# Patient Record
Sex: FEMALE | Race: WHITE | NOT HISPANIC OR LATINO | Employment: FULL TIME | URBAN - METROPOLITAN AREA
[De-identification: names, ages, dates, MRNs, and addresses within clinical notes are randomized per-mention and may not be internally consistent; named-entity substitution may affect disease eponyms.]

---

## 2024-02-19 ENCOUNTER — OFFICE VISIT (OUTPATIENT)
Dept: OBSTETRICS AND GYNECOLOGY | Facility: CLINIC | Age: 29
End: 2024-02-19
Payer: COMMERCIAL

## 2024-02-19 ENCOUNTER — HOSPITAL ENCOUNTER (OUTPATIENT)
Dept: RADIOLOGY | Facility: CLINIC | Age: 29
Discharge: HOME | End: 2024-02-19
Payer: COMMERCIAL

## 2024-02-19 ENCOUNTER — APPOINTMENT (OUTPATIENT)
Dept: RADIOLOGY | Facility: CLINIC | Age: 29
End: 2024-02-19
Payer: COMMERCIAL

## 2024-02-19 VITALS
SYSTOLIC BLOOD PRESSURE: 102 MMHG | HEIGHT: 69 IN | WEIGHT: 293 LBS | DIASTOLIC BLOOD PRESSURE: 64 MMHG | BODY MASS INDEX: 43.4 KG/M2

## 2024-02-19 DIAGNOSIS — N92.6 IRREGULAR PERIODS: ICD-10-CM

## 2024-02-19 DIAGNOSIS — Z01.419 WELL WOMAN EXAM WITH ROUTINE GYNECOLOGICAL EXAM: ICD-10-CM

## 2024-02-19 PROCEDURE — 99395 PREV VISIT EST AGE 18-39: CPT | Performed by: OBSTETRICS & GYNECOLOGY

## 2024-02-19 PROCEDURE — 4004F PT TOBACCO SCREEN RCVD TLK: CPT | Performed by: OBSTETRICS & GYNECOLOGY

## 2024-02-19 PROCEDURE — 88175 CYTOPATH C/V AUTO FLUID REDO: CPT

## 2024-02-19 PROCEDURE — 88141 CYTOPATH C/V INTERPRET: CPT | Performed by: PATHOLOGY

## 2024-02-19 PROCEDURE — 87624 HPV HI-RISK TYP POOLED RSLT: CPT

## 2024-02-19 PROCEDURE — 76830 TRANSVAGINAL US NON-OB: CPT | Performed by: OBSTETRICS & GYNECOLOGY

## 2024-02-19 RX ORDER — ESTRADIOL 0.5 MG/1
TABLET ORAL
Qty: 14 TABLET | Refills: 0 | Status: SHIPPED | OUTPATIENT
Start: 2024-02-19

## 2024-02-19 RX ORDER — SUMATRIPTAN 50 MG/1
TABLET, FILM COATED ORAL
COMMUNITY
Start: 2023-02-23 | End: 2024-03-11

## 2024-02-19 RX ORDER — NORETHINDRONE ACETATE AND ETHINYL ESTRADIOL AND FERROUS FUMARATE 1.5-30(21)
1 KIT ORAL DAILY
Qty: 84 TABLET | Refills: 4 | Status: SHIPPED | OUTPATIENT
Start: 2024-02-19 | End: 2024-02-28

## 2024-02-19 RX ORDER — NORETHINDRONE ACETATE AND ETHINYL ESTRADIOL AND FERROUS FUMARATE 1.5-30(21)
1 KIT ORAL DAILY
COMMUNITY
End: 2024-02-19 | Stop reason: SDUPTHER

## 2024-02-19 NOTE — PROGRESS NOTES
Subjective   Patient ID: Angelai Babin is a 28 y.o. female who presents for Well Women Visit (Annual exam with pap smear/- refill birth control//C/o  still getting periods twice a month//Chaperone declined).  HPI  As contained in the chief complaint  Review of Systems  10 symptoms have been reviewed and are negative and noncontributory to the patient current ailments.    Objective   Physical Exam  Vital signs reviewed    CONSTITUTIONAL- well nourished, well developed, looks like stated age.  She is sitting comfortably on the exam table in no apparent distress  SKIN- normal skin color and pigmentation no visible lesions  EYES- normal external exam  THYROID- symmetrical ,normal size and normal consistency  HEART- RRR without murmur, S3 or S4.  LUNGS- breathing comfortably, no dyspnea  EXTREMITIES- no deformities  NEUROLOGICAL- oriented and no focal signs.  Cranial nerves II through XII intact  PSYCHIATRIC- alert, pleasant and cordial, age-appropriate, not anxious, or depressed appearing  BREASTS-normal appearance, no skin changes or nipple discharge.  Palpation of the breast and axillae: No palpable mass and no axillary lymphadenopathy  ABDOMEN- soft, non distended, bowel sound normal pitch and intensity,no palpable abnormal masses  GENITOURINARY- External genitalia: Normal.                                 - Uterus: AV/AF NSSC, mobile and nontender                                -The adnexal areas are free of tenderness or mass                                -There are no cervical lesions; there is no cervical motion tenderness                                -Vagina without lesions, mucosa pink and well-hydrated, discharge is physiologic.                                   Inspection of Perianal Area: Normal    Assessment/Plan   Diagnoses and all orders for this visit:  Well woman exam with routine gynecological exam  -     THINPREP PAP TEST  Irregular periods  -     Rosario Vega 1.5/30, 28, 1.5 mg-30 mcg (21)/75 mg  (7) tablet; Take 1 tablet by mouth once daily.  -     US pelvis transvaginal; Future  -     estradiol (Estrace) 0.5 mg tablet; Tablets in addition to birth control days 1 through 14 of next cycle  -Ultrasound for evaluation of pelvic anatomy       Everett Huang DO 02/19/24 8:55 AM

## 2024-02-29 LAB
CYTOLOGY CMNT CVX/VAG CYTO-IMP: NORMAL
HPV HR 12 DNA GENITAL QL NAA+PROBE: POSITIVE
HPV HR GENOTYPES PNL CVX NAA+PROBE: POSITIVE
HPV16 DNA SPEC QL NAA+PROBE: NEGATIVE
HPV18 DNA SPEC QL NAA+PROBE: NEGATIVE
LAB AP CONTRACEPTIVE HISTORY: NORMAL
LAB AP HPV GENOTYPE QUESTION: YES
LAB AP HPV HR: NORMAL
LAB AP PREVIOUS ABNORMAL HISTORY: NORMAL
LABORATORY COMMENT REPORT: NORMAL
LMP START DATE: NORMAL
PATH REPORT.TOTAL CANCER: NORMAL

## 2024-03-11 ENCOUNTER — PROCEDURE VISIT (OUTPATIENT)
Dept: OBSTETRICS AND GYNECOLOGY | Facility: CLINIC | Age: 29
End: 2024-03-11
Payer: COMMERCIAL

## 2024-03-11 DIAGNOSIS — R87.810 CERVICAL HIGH RISK HUMAN PAPILLOMAVIRUS (HPV) DNA TEST POSITIVE: ICD-10-CM

## 2024-03-11 DIAGNOSIS — R87.611 ATYPICAL SQUAMOUS CELLS CANNOT EXCLUDE HIGH GRADE SQUAMOUS INTRAEPITHELIAL LESION ON CYTOLOGIC SMEAR OF CERVIX (ASC-H): ICD-10-CM

## 2024-03-11 PROCEDURE — 57454 BX/CURETT OF CERVIX W/SCOPE: CPT | Performed by: OBSTETRICS & GYNECOLOGY

## 2024-03-11 PROCEDURE — 88305 TISSUE EXAM BY PATHOLOGIST: CPT | Performed by: PATHOLOGY

## 2024-03-11 RX ORDER — SUMATRIPTAN SUCCINATE 100 MG/1
TABLET ORAL
COMMUNITY
Start: 2024-02-28

## 2024-03-11 NOTE — PROGRESS NOTES
Colposcopy Procedure Note    Indications: Pap smear 1 months ago showed: no abnormalities. The prior pap showed atypical squamous cells positive HPV prior cervical/vaginal disease: HPV related changes. Prior cervical treatment: no treatment.    Procedure Details   The risks and benefits of the procedure and Verbal informed consent obtained.    Speculum placed in vagina and excellent visualization of cervix achieved, cervix swabbed x 3 with acetic acid solution.    Findings:  Cervix: no visible lesions; endocervical curettage performed and cervical biopsies taken at 12 o'clock.  Vaginal inspection: normal without visible lesions.  Vulvar colposcopy: vulvar colposcopy not performed.    Specimens: ECC and cervical biopsy    Complications: none.    Plan:  Specimens labelled and sent to Pathology.  Will base further treatment on Pathology findings.  Post biopsy instructions given to patient.

## 2024-03-15 LAB
LABORATORY COMMENT REPORT: NORMAL
PATH REPORT.FINAL DX SPEC: NORMAL
PATH REPORT.GROSS SPEC: NORMAL
PATH REPORT.RELEVANT HX SPEC: NORMAL
PATH REPORT.TOTAL CANCER: NORMAL

## 2024-03-18 DIAGNOSIS — N92.6 IRREGULAR PERIODS: ICD-10-CM

## 2024-03-18 RX ORDER — ESTRADIOL 0.5 MG/1
TABLET ORAL
Qty: 42 TABLET | Refills: 1 | OUTPATIENT
Start: 2024-03-18

## 2025-04-09 ENCOUNTER — APPOINTMENT (OUTPATIENT)
Dept: OBSTETRICS AND GYNECOLOGY | Facility: CLINIC | Age: 30
End: 2025-04-09
Payer: COMMERCIAL

## 2025-04-09 ENCOUNTER — HOSPITAL ENCOUNTER (OUTPATIENT)
Dept: RADIOLOGY | Facility: CLINIC | Age: 30
Discharge: HOME | End: 2025-04-09
Payer: COMMERCIAL

## 2025-04-09 VITALS — HEIGHT: 69 IN | WEIGHT: 293 LBS | BODY MASS INDEX: 43.4 KG/M2

## 2025-04-09 DIAGNOSIS — N93.8 DUB (DYSFUNCTIONAL UTERINE BLEEDING): ICD-10-CM

## 2025-04-09 PROCEDURE — 4004F PT TOBACCO SCREEN RCVD TLK: CPT | Performed by: OBSTETRICS & GYNECOLOGY

## 2025-04-09 PROCEDURE — 3008F BODY MASS INDEX DOCD: CPT | Performed by: OBSTETRICS & GYNECOLOGY

## 2025-04-09 PROCEDURE — 99213 OFFICE O/P EST LOW 20 MIN: CPT | Performed by: OBSTETRICS & GYNECOLOGY

## 2025-04-09 RX ORDER — SPIRONOLACTONE 50 MG/1
50 TABLET, FILM COATED ORAL 2 TIMES DAILY
COMMUNITY
Start: 2024-03-12

## 2025-04-09 RX ORDER — PHENTERMINE HYDROCHLORIDE 37.5 MG/1
37.5 TABLET ORAL
COMMUNITY
Start: 2025-03-12

## 2025-04-09 RX ORDER — NORETHINDRONE ACETATE AND ETHINYL ESTRADIOL 1MG-20(21)
1 KIT ORAL DAILY
Qty: 84 TABLET | Refills: 1 | Status: SHIPPED | OUTPATIENT
Start: 2025-04-09 | End: 2026-04-09

## 2025-04-09 RX ORDER — RIMEGEPANT SULFATE 75 MG/75MG
1 TABLET, ORALLY DISINTEGRATING ORAL DAILY PRN
COMMUNITY
Start: 2025-03-31

## 2025-04-09 RX ORDER — TOPIRAMATE 25 MG/1
TABLET ORAL
COMMUNITY
Start: 2025-03-31

## 2025-04-09 ASSESSMENT — PAIN SCALES - GENERAL: PAINLEVEL_OUTOF10: 0-NO PAIN

## 2025-04-09 NOTE — PROGRESS NOTES
Subjective   Patient ID: Angelia Babin is a 29 y.o. female who presents for Menstrual Problem (Dub//C/o  still having irregular bleeding/- heavy painful periods, vomiting//Chaperon declined).  HPI  Angelia reports symptoms as above.  Stop the birth control pill for a short time and has since been having irregular bleeding.  Review of Systems  10 systems have been reviewed and are negative and noncontributory to the patient current ailments.    Objective   Physical Exam  Vital signs reviewed      Genitourinary:  External genitalia: Normal.                                 - Uterus: AV/AF NSSC, mobile and nontender                                -The adnexal areas are free of tenderness or mass                                -There are no cervical lesions; there is no cervical motion tenderness                                -Vagina without lesions, mucosa pink and well-hydrated, discharge is physiologic.    Assessment/Plan   Diagnoses and all orders for this visit:  DUB (dysfunctional uterine bleeding)  -Pelvic ultrasound for evaluation of anatomy  -Will begin a 20 mcg pill and reevaluate in 3 months         Everett Huang DO 04/09/25 9:36 AM

## 2025-04-10 ENCOUNTER — APPOINTMENT (OUTPATIENT)
Dept: OBSTETRICS AND GYNECOLOGY | Facility: CLINIC | Age: 30
End: 2025-04-10
Payer: COMMERCIAL

## 2025-07-17 ENCOUNTER — TELEPHONE (OUTPATIENT)
Dept: OBSTETRICS AND GYNECOLOGY | Facility: CLINIC | Age: 30
End: 2025-07-17
Payer: COMMERCIAL

## 2025-07-17 NOTE — TELEPHONE ENCOUNTER
Patient was wondering if you could give her a call regarding questions she has with the new birth control you prescribed end of April. Please call patient.